# Patient Record
Sex: FEMALE | Race: ASIAN | NOT HISPANIC OR LATINO
[De-identification: names, ages, dates, MRNs, and addresses within clinical notes are randomized per-mention and may not be internally consistent; named-entity substitution may affect disease eponyms.]

---

## 2020-11-18 ENCOUNTER — TRANSCRIPTION ENCOUNTER (OUTPATIENT)
Age: 70
End: 2020-11-18

## 2022-05-17 ENCOUNTER — NON-APPOINTMENT (OUTPATIENT)
Age: 72
End: 2022-05-17

## 2022-06-07 ENCOUNTER — NON-APPOINTMENT (OUTPATIENT)
Age: 72
End: 2022-06-07

## 2022-06-21 ENCOUNTER — APPOINTMENT (OUTPATIENT)
Dept: ORTHOPEDIC SURGERY | Facility: CLINIC | Age: 72
End: 2022-06-21
Payer: MEDICARE

## 2022-06-21 VITALS — WEIGHT: 164 LBS | HEIGHT: 61 IN | BODY MASS INDEX: 30.96 KG/M2

## 2022-06-21 DIAGNOSIS — Z78.9 OTHER SPECIFIED HEALTH STATUS: ICD-10-CM

## 2022-06-21 DIAGNOSIS — M19.90 UNSPECIFIED OSTEOARTHRITIS, UNSPECIFIED SITE: ICD-10-CM

## 2022-06-21 DIAGNOSIS — I10 ESSENTIAL (PRIMARY) HYPERTENSION: ICD-10-CM

## 2022-06-21 PROBLEM — Z00.00 ENCOUNTER FOR PREVENTIVE HEALTH EXAMINATION: Status: ACTIVE | Noted: 2022-06-21

## 2022-06-21 PROCEDURE — 73630 X-RAY EXAM OF FOOT: CPT | Mod: LT

## 2022-06-21 PROCEDURE — 99203 OFFICE O/P NEW LOW 30 MIN: CPT

## 2022-06-21 NOTE — PHYSICAL EXAM
[NL 30)] : inversion 30 degrees [NL (40)] : MTP joint DF 40 degrees [NL (20)] : MTP joint PF 20 degrees [5___] : [facility] 5[unfilled]/5 [2+] : posterior tibialis pulse: 2+ [Left] : left foot [There are no fractures, subluxations or dislocations. No significant abnormalities are seen] : There are no fractures, subluxations or dislocations. No significant abnormalities are seen [] : non-antalgic [FreeTextEntry3] : [facility]

## 2022-06-21 NOTE — REASON FOR VISIT
[FreeTextEntry2] : PATIENT IS HERE FOR LEFT FOOT PAIN ARCH AREA THE WORST FOR THE PAST 2 MONTHS, PATIENT TRIED STRETCHES, WATER AEROBICS, CAM WALKER BOOT , Inflammatory , XRAY DONE AND CORTISONE INJECTION

## 2022-06-21 NOTE — ASSESSMENT
[FreeTextEntry1] : CONTINUE WITH CAM WALKER.\par CAN PLAY PICKLEBALL BUT LESS AMOUNT. \par I AM ORDERING AN MRI OF ANKLE TO RULE OUT PTTD AND OR CALCANEAL FRACTURE.

## 2022-06-26 ENCOUNTER — FORM ENCOUNTER (OUTPATIENT)
Age: 72
End: 2022-06-26

## 2022-06-28 ENCOUNTER — APPOINTMENT (OUTPATIENT)
Dept: ORTHOPEDIC SURGERY | Facility: CLINIC | Age: 72
End: 2022-06-28

## 2022-06-28 PROCEDURE — 99213 OFFICE O/P EST LOW 20 MIN: CPT

## 2022-06-28 NOTE — ASSESSMENT
[FreeTextEntry1] : Casted for orthotics.\par Can go to Pickleball and play as tolerated.\par NSAID of choice.\par

## 2022-06-28 NOTE — PHYSICAL EXAM
[Left] : left foot and ankle [NL 30)] : inversion 30 degrees [NL (40)] : MTP joint DF 40 degrees [NL (20)] : MTP joint PF 20 degrees [5___] : [facility] 5[unfilled]/5 [2+] : posterior tibialis pulse: 2+ [Normal] : saphenous nerve sensation normal [] : varicosities are not warm and well perfused

## 2022-06-28 NOTE — DATA REVIEWED
[MRI] : MRI [Left] : left [Ankle] : ankle [Report was reviewed and noted in the chart] : The report was reviewed and noted in the chart [I reviewed the films/CD and agree] : I reviewed the films/CD and agree

## 2022-07-25 ENCOUNTER — APPOINTMENT (OUTPATIENT)
Dept: PODIATRY | Facility: CLINIC | Age: 72
End: 2022-07-25

## 2022-07-25 PROCEDURE — 99213 OFFICE O/P EST LOW 20 MIN: CPT

## 2022-10-14 ENCOUNTER — APPOINTMENT (OUTPATIENT)
Dept: PODIATRY | Facility: CLINIC | Age: 72
End: 2022-10-14

## 2022-10-14 DIAGNOSIS — M76.822 POSTERIOR TIBIAL TENDINITIS, LEFT LEG: ICD-10-CM

## 2022-10-14 DIAGNOSIS — M79.672 PAIN IN LEFT FOOT: ICD-10-CM

## 2022-10-14 PROCEDURE — 99213 OFFICE O/P EST LOW 20 MIN: CPT

## 2022-10-14 NOTE — REASON FOR VISIT
[FreeTextEntry2] : Patient is here for left foot follow up AFTER DISPENSING ORTHOTICS AND THAT IS FINE BUT PATIENT REINJURED HER TENDON AGAIN ABOUT A MONTH AGO AND WOULD LIKE MORE PHYSICAL THERAPY TO STRENGTH THIS LEG.

## 2022-10-14 NOTE — ASSESSMENT
[FreeTextEntry1] : STOP PLAYING PICKLEBALL AGAIN AND REST FOOT. \par CONTINUE WITH CAM WALKER AND OR ANKLE BRACE. \par MRI TO BE ORDERED IF PAIN CONTINUES.

## 2022-10-18 NOTE — ASSESSMENT
[FreeTextEntry1] : DISPENSED ORTHOTICS WITH BREAK IN INSTRUCTIONS. \par THE ORTHOTICS ARE MEDICALLY NECESSARY TO REDUCE THE EXCESSIVE PRONATION THAT IS CAUSING THE PATIENTS PAIN WHILE WALKING OR STANDING.\par THE GOAL OF THE ORTHOTIC IS TO REALIGN HER GAIT AND REDUCE THE ABNORMAL PRONATION THAT IS CAUSING HER PAINFUL SYMPTOMS.

## 2022-10-18 NOTE — PHYSICAL EXAM
[Left] : left foot and ankle [NL 30)] : inversion 30 degrees [NL (40)] : MTP joint DF 40 degrees [NL (20)] : MTP joint PF 20 degrees [5___] : [facility] 5[unfilled]/5 [2+] : posterior tibialis pulse: 2+ [Normal] : deep peroneal nerve sensation normal [] : varicosities are not warm and well perfused

## 2022-11-11 ENCOUNTER — APPOINTMENT (OUTPATIENT)
Dept: PODIATRY | Facility: CLINIC | Age: 72
End: 2022-11-11

## 2023-08-30 ENCOUNTER — NON-APPOINTMENT (OUTPATIENT)
Age: 73
End: 2023-08-30